# Patient Record
Sex: FEMALE | Race: WHITE | URBAN - METROPOLITAN AREA
[De-identification: names, ages, dates, MRNs, and addresses within clinical notes are randomized per-mention and may not be internally consistent; named-entity substitution may affect disease eponyms.]

---

## 2017-04-11 ENCOUNTER — OFFICE VISIT (OUTPATIENT)
Dept: INTERNAL MEDICINE CLINIC | Age: 37
End: 2017-04-11

## 2017-04-11 ENCOUNTER — HOSPITAL ENCOUNTER (OUTPATIENT)
Dept: CT IMAGING | Age: 37
Discharge: HOME | End: 2017-04-11
Attending: NURSE PRACTITIONER | Admitting: NURSE PRACTITIONER

## 2017-04-11 VITALS
OXYGEN SATURATION: 98 % | BODY MASS INDEX: 21.14 KG/M2 | WEIGHT: 112 LBS | SYSTOLIC BLOOD PRESSURE: 100 MMHG | HEART RATE: 62 BPM | DIASTOLIC BLOOD PRESSURE: 68 MMHG | HEIGHT: 61 IN

## 2017-04-11 DIAGNOSIS — Z79.890 ON HORMONE REPLACEMENT THERAPY: ICD-10-CM

## 2017-04-11 DIAGNOSIS — R06.02 SOB (SHORTNESS OF BREATH): ICD-10-CM

## 2017-04-11 DIAGNOSIS — R07.2 PRECORDIAL PAIN: ICD-10-CM

## 2017-04-11 DIAGNOSIS — K58.1 IRRITABLE BOWEL SYNDROME WITH CONSTIPATION: ICD-10-CM

## 2017-04-11 DIAGNOSIS — R07.2 PRECORDIAL PAIN: Primary | ICD-10-CM

## 2017-04-11 PROBLEM — J45.909 ALLERGY-INDUCED ASTHMA: Status: ACTIVE | Noted: 2017-04-11

## 2017-04-11 LAB
BASOPHILS ABSOLUTE: 0 K/UL (ref 0–0.2)
BASOPHILS RELATIVE PERCENT: 0.9 %
D DIMER: <200 NG/ML DDU (ref 0–229)
EOSINOPHILS ABSOLUTE: 0.1 K/UL (ref 0–0.6)
EOSINOPHILS RELATIVE PERCENT: 1.3 %
HCT VFR BLD CALC: 40.9 % (ref 36–48)
HEMOGLOBIN: 13.5 G/DL (ref 12–16)
LYMPHOCYTES ABSOLUTE: 1.6 K/UL (ref 1–5.1)
LYMPHOCYTES RELATIVE PERCENT: 30.1 %
MCH RBC QN AUTO: 29.7 PG (ref 26–34)
MCHC RBC AUTO-ENTMCNC: 32.9 G/DL (ref 31–36)
MCV RBC AUTO: 90.3 FL (ref 80–100)
MONOCYTES ABSOLUTE: 0.3 K/UL (ref 0–1.3)
MONOCYTES RELATIVE PERCENT: 6.1 %
NEUTROPHILS ABSOLUTE: 3.3 K/UL (ref 1.7–7.7)
NEUTROPHILS RELATIVE PERCENT: 61.6 %
PDW BLD-RTO: 12.8 % (ref 12.4–15.4)
PLATELET # BLD: 190 K/UL (ref 135–450)
PMV BLD AUTO: 8.6 FL (ref 5–10.5)
RBC # BLD: 4.53 M/UL (ref 4–5.2)
WBC # BLD: 5.3 K/UL (ref 4–11)

## 2017-04-11 PROCEDURE — 99214 OFFICE O/P EST MOD 30 MIN: CPT | Performed by: NURSE PRACTITIONER

## 2017-04-11 PROCEDURE — 93000 ELECTROCARDIOGRAM COMPLETE: CPT | Performed by: NURSE PRACTITIONER

## 2017-04-11 ASSESSMENT — PATIENT HEALTH QUESTIONNAIRE - PHQ9
SUM OF ALL RESPONSES TO PHQ QUESTIONS 1-9: 0
1. LITTLE INTEREST OR PLEASURE IN DOING THINGS: 0
2. FEELING DOWN, DEPRESSED OR HOPELESS: 0
SUM OF ALL RESPONSES TO PHQ9 QUESTIONS 1 & 2: 0

## 2017-04-11 ASSESSMENT — ENCOUNTER SYMPTOMS
VOMITING: 0
BLOOD IN STOOL: 0
CHEST TIGHTNESS: 0
CONSTIPATION: 0
COUGH: 0
SHORTNESS OF BREATH: 0
NAUSEA: 0
DIARRHEA: 0
ABDOMINAL PAIN: 0
WHEEZING: 0
ABDOMINAL DISTENTION: 0

## 2017-10-07 DIAGNOSIS — J45.31 ALLERGY-INDUCED ASTHMA, MILD PERSISTENT, WITH ACUTE EXACERBATION: ICD-10-CM

## 2017-10-07 DIAGNOSIS — R06.02 SOB (SHORTNESS OF BREATH): ICD-10-CM

## 2017-10-09 NOTE — TELEPHONE ENCOUNTER
How are her allergies and asthma with this? Is she needing her albuterol inhaler? If so, who often is she using it?

## 2017-10-21 RX ORDER — MONTELUKAST SODIUM 10 MG/1
TABLET ORAL
Qty: 30 TABLET | Refills: 3 | Status: SHIPPED | OUTPATIENT
Start: 2017-10-21 | End: 2017-10-23 | Stop reason: SDUPTHER

## 2017-10-23 DIAGNOSIS — J45.31 ALLERGY-INDUCED ASTHMA, MILD PERSISTENT, WITH ACUTE EXACERBATION: ICD-10-CM

## 2017-10-23 DIAGNOSIS — R06.02 SOB (SHORTNESS OF BREATH): ICD-10-CM

## 2017-10-23 RX ORDER — MONTELUKAST SODIUM 10 MG/1
TABLET ORAL
Qty: 30 TABLET | Refills: 3 | Status: SHIPPED | OUTPATIENT
Start: 2017-10-23 | End: 2018-01-18 | Stop reason: SDUPTHER

## 2017-10-23 NOTE — TELEPHONE ENCOUNTER
Talked    With patient  Just for the allergies  She still has the albuterol inhaler she does not need that

## 2018-02-01 ENCOUNTER — TELEPHONE (OUTPATIENT)
Dept: INTERNAL MEDICINE CLINIC | Age: 38
End: 2018-02-01

## 2018-02-01 DIAGNOSIS — R06.02 SOB (SHORTNESS OF BREATH): ICD-10-CM

## 2018-02-01 DIAGNOSIS — J45.31 ALLERGY-INDUCED ASTHMA, MILD PERSISTENT, WITH ACUTE EXACERBATION: ICD-10-CM

## 2018-02-01 RX ORDER — MONTELUKAST SODIUM 10 MG/1
TABLET ORAL
Qty: 90 TABLET | Refills: 0 | Status: SHIPPED | OUTPATIENT
Start: 2018-02-01 | End: 2018-04-28 | Stop reason: SDUPTHER

## 2018-02-01 NOTE — TELEPHONE ENCOUNTER
Patient is out of singular and needs a new script for a 90 day refill .  She has 30 remaining on her old script but her insurance will not pay unless it is 90 day   [phone]

## 2018-02-12 ENCOUNTER — OFFICE VISIT (OUTPATIENT)
Dept: INTERNAL MEDICINE CLINIC | Age: 38
End: 2018-02-12

## 2018-02-12 VITALS
HEART RATE: 64 BPM | SYSTOLIC BLOOD PRESSURE: 100 MMHG | HEIGHT: 61 IN | WEIGHT: 112.2 LBS | DIASTOLIC BLOOD PRESSURE: 62 MMHG | BODY MASS INDEX: 21.18 KG/M2 | OXYGEN SATURATION: 97 %

## 2018-02-12 DIAGNOSIS — Z23 NEED FOR PNEUMOCOCCAL VACCINATION: ICD-10-CM

## 2018-02-12 DIAGNOSIS — J30.9 CHRONIC ALLERGIC RHINITIS, UNSPECIFIED SEASONALITY, UNSPECIFIED TRIGGER: Primary | ICD-10-CM

## 2018-02-12 DIAGNOSIS — J45.20 MILD INTERMITTENT EXTRINSIC ASTHMA WITHOUT COMPLICATION: ICD-10-CM

## 2018-02-12 DIAGNOSIS — Z23 NEED FOR INFLUENZA VACCINATION: ICD-10-CM

## 2018-02-12 PROCEDURE — 99213 OFFICE O/P EST LOW 20 MIN: CPT | Performed by: NURSE PRACTITIONER

## 2018-02-12 RX ORDER — ALBUTEROL SULFATE 90 UG/1
1-2 AEROSOL, METERED RESPIRATORY (INHALATION) EVERY 6 HOURS PRN
Qty: 1 INHALER | Refills: 1 | Status: SHIPPED | OUTPATIENT
Start: 2018-02-12

## 2018-02-12 ASSESSMENT — ENCOUNTER SYMPTOMS
SINUS PRESSURE: 0
SHORTNESS OF BREATH: 0
WHEEZING: 0
CHEST TIGHTNESS: 0
COUGH: 0
SINUS PAIN: 0
RHINORRHEA: 0

## 2018-02-12 NOTE — PATIENT INSTRUCTIONS
Assessment/Plan     1. Chronic allergic rhinitis, unspecified seasonality, unspecified trigger  Stable. Continue Singulair, Flonase and albuterol, as needed. Follow up annually    2. Mild intermittent extrinsic asthma without complication  Stable. Continue Singulair, Flonase and albuterol, as needed. Follow up annually    - albuterol sulfate  (90 Base) MCG/ACT inhaler; Inhale 1-2 puffs into the lungs every 6 hours as needed for Wheezing  Dispense: 1 Inhaler; Refill: 1    3. Need for influenza vaccination  Recommend this    4. Need for pneumococcal vaccination  Recommend this. Discussed medications with patient, who voiced understanding of their use and indications. All questions answered. Pt is advised to call if symptoms worsen or do not improve. Patient Education          pneumococcal polysaccharides vaccine (PPSV), 23-valent  Pronunciation:  TRESA Castro, 23-VAY lent  Brand:  Pneumovax 23  What is the most important information I should know about this vaccine? PPSV should be given at least 2 weeks before the start of any treatment that can weaken your immune system. PPSV is also given at least 2 weeks before you undergo a splenectomy (surgical removal of the spleen). The timing of this vaccination is very important for it to be effective. Follow your doctor's instructions. You can still receive a vaccine if you have a cold or fever. In the case of a more severe illness with a fever or any type of infection, wait until you get better before receiving this vaccine. You should not receive a booster vaccine if you had a life-threatening allergic reaction after the first shot. Keep track of any and all side effects you have after receiving this vaccine. If you ever need to receive a booster dose, you will need to tell your doctor if the previous shot caused any side effects.   Becoming infected with pneumococcal disease (such as pneumonia or meningitis) is much steroid medication for a long period of time;  · if you are scheduled to have your spleen removed (splenectomy); or  · if you have received a pneumococcal vaccine within the past 3 to 5 years. You can still receive a vaccine if you have a cold or fever. In the case of a more severe illness with a fever or any type of infection, wait until you get better before receiving this vaccine. Vaccines may be harmful to an unborn baby and generally should not be given to a pregnant woman. However, not vaccinating the mother could be more harmful to the baby if the mother becomes infected with a disease that this vaccine could prevent. Your doctor will decide whether you should receive this vaccine, especially if you have a high risk of infection with pneumococcal disease. It is not known whether PPSV passes into breast milk or if it could harm a nursing baby. Do not use this medication without telling your doctor if you are breast-feeding a baby. How is this vaccine given? PPSV is given as an injection (shot) under the skin or into a muscle of your arm or thigh. You will receive this injection in a doctor's office or other clinic setting. PPSV is usually given as a routine vaccination in adults who are 72 years and older. PPSV may also be given to people between the ages 3and 59years old who have:  · heart disease, lung disease, or diabetes;  · a cerebrospinal fluid leak, or a cochlear implant (an electronic hearing device);  · alcoholism or liver disease (including cirrhosis);  · sickle cell disease or a disorder of the spleen;  · a weak immune system caused by HIV, AIDS, cancer, kidney failure, organ transplantation, or a damaged spleen; or  · a weak immune system caused by taking steroids or receiving chemotherapy or radiation treatment. PPSV may also be given to people between the ages 23and 59years old who smoke or have asthma.   PPSV should be given at least 2 weeks before the start of any treatment that can weaken your immune system. PPSV is also given at least 2 weeks before you undergo a splenectomy (surgical removal of the spleen). The timing of this vaccination is very important for it to be effective. Follow your doctor's instructions. Your doctor may recommend treating fever and pain with an aspirin-free pain reliever such as acetaminophen (Tylenol) or ibuprofen (Motrin, Advil, and others) when the shot is given and for the next 24 hours. Follow the label directions or your doctor's instructions about how much of this medicine to take. If your doctor has prescribed an antibiotic (such as penicillin) to help prevent infection with pneumococcal bacteria, do not stop using the antibiotic after you receive the PPSV. Take the antibiotic for the entire length of time prescribed by your doctor. Most people receive only one PPSV shot during their lifetime. However, people in certain age groups or with certain disease conditions that put them at risk of infection may need to receive more than one vaccine. Before receiving this vaccine, tell your doctor if you have received a pneumococcal vaccine within the past 3 to 5 years. What happens if I miss a dose? Since PPSV is usually given only one time, you will most likely not be on a dosing schedule. If you are receiving a repeat PPSV shot, be sure to tell your doctor if it has been less than 5 years since you last received a pneumococcal vaccine. What happens if I overdose? An overdose of this vaccine is not likely to occur. What should I avoid before or after receiving this vaccine ? Follow your doctor's instructions about any restrictions on food, beverages, or activity. What are the possible side effects of this vaccine? You should not receive a booster vaccine if you had a life-threatening allergic reaction after the first shot. Keep track of any and all side effects you have after receiving this vaccine.  If you ever need to receive a booster dose, you will that can weaken the immune system, including:  · an oral, nasal, inhaled, or injectable steroid medicine;  · medications to treat psoriasis, rheumatoid arthritis, or other autoimmune disorders, such as azathioprine (Imuran), etanercept (Enbrel), leflunomide (Arava), and others; or  · medicines to treat or prevent organ transplant rejection, such as basiliximab (Simulect), cyclosporine (Sandimmune, Neoral, Gengraf), muromonab-CD3 (Orthoclone), mycophenolate mofetil (CellCept), sirolimus (Rapamune), or tacrolimus (Prograf). If you are using any of these medications, you may not be able to receive the vaccine, or may need to wait until the other treatments are finished. This list is not complete and other drugs may interact with PPSV. Tell your doctor about all medications you use. This includes prescription, over-the-counter, vitamin, and herbal products. Do not start a new medication without telling your doctor. Where can I get more information? Your doctor or pharmacist may have additional information about pneumococcal polysaccharides vaccine. You may also find additional information from your local health department or the Centers for Disease Control and Prevention. Remember, keep this and all other medicines out of the reach of children, never share your medicines with others, and use this medication only for the indication prescribed. Every effort has been made to ensure that the information provided by [facility] [address] [address] is accurate, up-to-date, and complete, but no guarantee is made to that effect. Drug information contained herein may be time sensitive. [facility] information has been compiled for use by healthcare practitioners and consumers in the [place] and therefore [facility] does not warrant that uses outside of the [place] are appropriate, unless specifically indicated otherwise. [facility]'s drug information does not endorse drugs, diagnose patients or recommend therapy.  [facility]

## 2018-04-28 DIAGNOSIS — R06.02 SOB (SHORTNESS OF BREATH): ICD-10-CM

## 2018-04-28 DIAGNOSIS — J45.31 ALLERGY-INDUCED ASTHMA, MILD PERSISTENT, WITH ACUTE EXACERBATION: ICD-10-CM

## 2018-04-30 RX ORDER — MONTELUKAST SODIUM 10 MG/1
TABLET ORAL
Qty: 90 TABLET | Refills: 2 | Status: SHIPPED | OUTPATIENT
Start: 2018-04-30

## 2020-07-30 ENCOUNTER — OFFICE VISIT (OUTPATIENT)
Dept: PRIMARY CARE CLINIC | Age: 40
End: 2020-07-30

## 2020-07-30 PROCEDURE — 99211 OFF/OP EST MAY X REQ PHY/QHP: CPT | Performed by: NURSE PRACTITIONER

## 2020-07-30 NOTE — PATIENT INSTRUCTIONS
Advance Care Planning  People with COVID-19 may have no symptoms, mild symptoms, such as fever, cough, and shortness of breath or they may have more severe illness, developing severe and fatal pneumonia. As a result, Advance Care Planning with attention to naming a health care decision maker (someone you trust to make healthcare decisions for you if you could not speak for yourself) and sharing other health care preferences is important BEFORE a possible health crisis. Please contact your Primary Care Provider to discuss Advance Care Planning. Preventing the Spread of Coronavirus Disease 2019 in Homes and Residential Communities  For the most recent information go to [email or web address]    Prevention steps for People with confirmed or suspected COVID-19 (including persons under investigation) who do not need to be hospitalized  and   People with confirmed COVID-19 who were hospitalized and determined to be medically stable to go home    Your healthcare provider and public health staff will evaluate whether you can be cared for at home. If it is determined that you do not need to be hospitalized and can be isolated at home, you will be monitored by staff from your local or state health department. You should follow the prevention steps below until a healthcare provider or local or state health department says you can return to your normal activities. Stay home except to get medical care  People who are mildly ill with COVID-19 are able to isolate at home during their illness. You should restrict activities outside your home, except for getting medical care. Do not go to work, school, or public areas. Avoid using public transportation, ride-sharing, or taxis. Separate yourself from other people and animals in your home  People: As much as possible, you should stay in a specific room and away from other people in your home.  Also, you should use a separate before eating or preparing food. If soap and water are not readily available, use an alcohol-based hand  with at least 60% alcohol, covering all surfaces of your hands and rubbing them together until they feel dry. Soap and water are the best option if hands are visibly dirty. Avoid touching your eyes, nose, and mouth with unwashed hands. Avoid sharing personal household items  You should not share dishes, drinking glasses, cups, eating utensils, towels, or bedding with other people or pets in your home. After using these items, they should be washed thoroughly with soap and water. Clean all high-touch surfaces everyday  High touch surfaces include counters, tabletops, doorknobs, bathroom fixtures, toilets, phones, keyboards, tablets, and bedside tables. Also, clean any surfaces that may have blood, stool, or body fluids on them. Use a household cleaning spray or wipe, according to the label instructions. Labels contain instructions for safe and effective use of the cleaning product including precautions you should take when applying the product, such as wearing gloves and making sure you have good ventilation during use of the product. Monitor your symptoms  Seek prompt medical attention if your illness is worsening (e.g., difficulty breathing). Before seeking care, call your healthcare provider and tell them that you have, or are being evaluated for, COVID-19. Put on a facemask before you enter the facility. These steps will help the healthcare providers office to keep other people in the office or waiting room from getting infected or exposed. Ask your healthcare provider to call the local or state health department. Persons who are placed under active monitoring or facilitated self-monitoring should follow instructions provided by their local health department or occupational health professionals, as appropriate. When working with your local health department check their available hours.   If you have a medical emergency and need to call 911, notify the dispatch personnel that you have, or are being evaluated for COVID-19. If possible, put on a facemask before emergency medical services arrive. Discontinuing home isolation  Patients with confirmed COVID-19 should remain under home isolation precautions until the risk of secondary transmission to others is thought to be low. The decision to discontinue home isolation precautions should be made on a case-by-case basis, in consultation with healthcare providers and state and local health departments.

## 2020-07-30 NOTE — PROGRESS NOTES
Brent Hicks received a viral test for COVID-19. They were educated on isolation and quarantine as appropriate. For any symptoms, they were directed to seek care from their PCP, given contact information to establish with a doctor, directed to an urgent care or the emergency room.

## 2020-08-01 LAB — SARS-COV-2, NAA: NOT DETECTED
